# Patient Record
Sex: FEMALE | Race: ASIAN | URBAN - METROPOLITAN AREA
[De-identification: names, ages, dates, MRNs, and addresses within clinical notes are randomized per-mention and may not be internally consistent; named-entity substitution may affect disease eponyms.]

---

## 2017-11-15 ENCOUNTER — IMPORTED ENCOUNTER (OUTPATIENT)
Dept: URBAN - METROPOLITAN AREA CLINIC 50 | Facility: CLINIC | Age: 52
End: 2017-11-15

## 2018-11-30 NOTE — PATIENT DISCUSSION
Cataract(s) are not yet visually significant to the patient. Recommend monitoring, and patient agrees with this plan.
Glasses Rx given.
I have personally performed a face to face diagnostic evaluation on this patient. I have reviewed the ACP note and agree with the history, exam and plan of care, except as noted.

## 2019-06-03 ENCOUNTER — IMPORTED ENCOUNTER (OUTPATIENT)
Dept: URBAN - METROPOLITAN AREA CLINIC 50 | Facility: CLINIC | Age: 54
End: 2019-06-03

## 2020-06-08 ENCOUNTER — IMPORTED ENCOUNTER (OUTPATIENT)
Dept: URBAN - METROPOLITAN AREA CLINIC 50 | Facility: CLINIC | Age: 55
End: 2020-06-08

## 2021-04-17 ASSESSMENT — TONOMETRY
OD_IOP_MMHG: 14
OS_IOP_MMHG: 15
OD_IOP_MMHG: 13
OS_IOP_MMHG: 13
OS_IOP_MMHG: 14
OD_IOP_MMHG: 14

## 2021-04-17 ASSESSMENT — VISUAL ACUITY
OD_BAT: 20/25
OS_SC: 20/20-1
OD_SC: 20/20
OS_SC: 20/20
OD_SC: 20/20
OD_OTHER: 20/25. 20/40.
OS_SC: 20/20 BLURRY
OS_BAT: 20/25
OD_SC: 20/20
OS_OTHER: 20/25. 20/40.
OS_CC: J1+
OD_CC: J1+

## 2021-06-11 ENCOUNTER — PREPPED CHART (OUTPATIENT)
Dept: URBAN - METROPOLITAN AREA CLINIC 49 | Facility: CLINIC | Age: 56
End: 2021-06-11

## 2021-06-14 ENCOUNTER — COMPREHENSIVE EXAM (OUTPATIENT)
Dept: URBAN - METROPOLITAN AREA CLINIC 49 | Facility: CLINIC | Age: 56
End: 2021-06-14

## 2021-06-14 DIAGNOSIS — Z01.01: ICD-10-CM

## 2021-06-14 DIAGNOSIS — R73.09: ICD-10-CM

## 2021-06-14 DIAGNOSIS — H35.361: ICD-10-CM

## 2021-06-14 PROCEDURE — 92015 DETERMINE REFRACTIVE STATE: CPT

## 2021-06-14 PROCEDURE — 92014 COMPRE OPH EXAM EST PT 1/>: CPT

## 2021-06-14 PROCEDURE — 92134 CPTRZ OPH DX IMG PST SGM RTA: CPT

## 2021-06-14 ASSESSMENT — VISUAL ACUITY
OS_SC: 20/20
OS_GLARE: 20/20
OD_GLARE: 20/20
OD_SC: 20/20

## 2021-06-14 ASSESSMENT — TONOMETRY
OS_IOP_MMHG: 14
OD_IOP_MMHG: 14

## 2022-08-05 ENCOUNTER — COMPREHENSIVE EXAM (OUTPATIENT)
Dept: URBAN - METROPOLITAN AREA CLINIC 48 | Facility: LOCATION | Age: 57
End: 2022-08-05

## 2022-08-05 DIAGNOSIS — H25.13: ICD-10-CM

## 2022-08-05 DIAGNOSIS — H35.361: ICD-10-CM

## 2022-08-05 DIAGNOSIS — Z01.01: ICD-10-CM

## 2022-08-05 PROCEDURE — 92014 COMPRE OPH EXAM EST PT 1/>: CPT

## 2022-08-05 ASSESSMENT — VISUAL ACUITY
OS_GLARE: 20/25
OU_CC: J1+ @ 14IN
OS_SC: 20/30
OD_SC: 20/25
OS_GLARE: 20/30
OD_GLARE: 20/50
OD_GLARE: 20/25
OU_SC: 20/25
OS_PH: 20/25

## 2022-08-05 ASSESSMENT — KERATOMETRY
OD_AXISANGLE2_DEGREES: 80
OS_K1POWER_DIOPTERS: 45.25
OD_K2POWER_DIOPTERS: 45.75
OS_K2POWER_DIOPTERS: 45.75
OS_AXISANGLE2_DEGREES: 135
OS_AXISANGLE_DEGREES: 45
OD_AXISANGLE_DEGREES: 170
OD_K1POWER_DIOPTERS: 45.00

## 2022-08-05 ASSESSMENT — TONOMETRY
OD_IOP_MMHG: 14
OS_IOP_MMHG: 16

## 2023-08-17 ENCOUNTER — COMPREHENSIVE EXAM (OUTPATIENT)
Dept: URBAN - METROPOLITAN AREA CLINIC 48 | Facility: LOCATION | Age: 58
End: 2023-08-17

## 2023-08-17 DIAGNOSIS — H25.13: ICD-10-CM

## 2023-08-17 DIAGNOSIS — H35.361: ICD-10-CM

## 2023-08-17 DIAGNOSIS — H35.371: ICD-10-CM

## 2023-08-17 DIAGNOSIS — H52.4: ICD-10-CM

## 2023-08-17 DIAGNOSIS — Z01.01: ICD-10-CM

## 2023-08-17 DIAGNOSIS — R73.03: ICD-10-CM

## 2023-08-17 DIAGNOSIS — H52.203: ICD-10-CM

## 2023-08-17 DIAGNOSIS — H43.812: ICD-10-CM

## 2023-08-17 PROCEDURE — 92015 DETERMINE REFRACTIVE STATE: CPT

## 2023-08-17 PROCEDURE — 92014 COMPRE OPH EXAM EST PT 1/>: CPT

## 2023-08-17 PROCEDURE — 92134 CPTRZ OPH DX IMG PST SGM RTA: CPT

## 2023-08-17 ASSESSMENT — KERATOMETRY
OD_AXISANGLE2_DEGREES: 80
OD_K1POWER_DIOPTERS: 45.00
OS_K2POWER_DIOPTERS: 45.75
OD_AXISANGLE_DEGREES: 170
OS_K1POWER_DIOPTERS: 45.25
OD_K2POWER_DIOPTERS: 45.75
OS_AXISANGLE2_DEGREES: 135
OS_AXISANGLE_DEGREES: 45

## 2023-08-17 ASSESSMENT — TONOMETRY
OS_IOP_MMHG: 15
OD_IOP_MMHG: 14

## 2023-08-17 ASSESSMENT — VISUAL ACUITY
OD_SC: 20/20
OU_CC: J1+ @16IN
OD_GLARE: 20/20-1
OS_SC: 20/20-2
OS_GLARE: 20/20-2

## 2024-08-29 ENCOUNTER — COMPREHENSIVE EXAM (OUTPATIENT)
Dept: URBAN - METROPOLITAN AREA CLINIC 49 | Facility: LOCATION | Age: 59
End: 2024-08-29

## 2024-08-29 DIAGNOSIS — Z01.01: ICD-10-CM

## 2024-08-29 DIAGNOSIS — H35.371: ICD-10-CM

## 2024-08-29 DIAGNOSIS — H02.834: ICD-10-CM

## 2024-08-29 DIAGNOSIS — H25.13: ICD-10-CM

## 2024-08-29 DIAGNOSIS — R73.03: ICD-10-CM

## 2024-08-29 DIAGNOSIS — H43.812: ICD-10-CM

## 2024-08-29 DIAGNOSIS — H52.4: ICD-10-CM

## 2024-08-29 DIAGNOSIS — H02.831: ICD-10-CM

## 2024-08-29 DIAGNOSIS — H35.361: ICD-10-CM

## 2024-08-29 PROCEDURE — 92015 DETERMINE REFRACTIVE STATE: CPT

## 2024-08-29 PROCEDURE — 92014 COMPRE OPH EXAM EST PT 1/>: CPT

## 2024-08-29 ASSESSMENT — TONOMETRY
OD_IOP_MMHG: 13
OS_IOP_MMHG: 14

## 2024-08-29 ASSESSMENT — VISUAL ACUITY
OS_GLARE: 20/20
OD_SC: 20/20-2
OS_GLARE: 20/30
OU_SC: 20/20
OD_GLARE: 20/20
OS_SC: 20/25-2
OU_CC: J1@17"
OD_GLARE: 20/25